# Patient Record
Sex: MALE | Race: WHITE | Employment: FULL TIME | URBAN - METROPOLITAN AREA
[De-identification: names, ages, dates, MRNs, and addresses within clinical notes are randomized per-mention and may not be internally consistent; named-entity substitution may affect disease eponyms.]

---

## 2017-01-03 ENCOUNTER — HOSPITAL ENCOUNTER (OUTPATIENT)
Dept: GENERAL RADIOLOGY | Facility: HOSPITAL | Age: 32
Discharge: HOME | End: 2017-01-03
Attending: FAMILY MEDICINE
Payer: COMMERCIAL

## 2017-01-03 DIAGNOSIS — S93.492A SPRAIN OF OTHER LIGAMENT OF LEFT ANKLE, INITIAL ENCOUNTER: ICD-10-CM

## 2017-01-03 PROCEDURE — 73600 X-RAY EXAM OF ANKLE: CPT

## 2017-02-02 ENCOUNTER — OFFICE VISIT (OUTPATIENT)
Dept: INTERNAL MEDICINE CLINIC | Facility: CLINIC | Age: 32
End: 2017-02-02

## 2017-02-02 VITALS
HEIGHT: 72 IN | BODY MASS INDEX: 24.41 KG/M2 | DIASTOLIC BLOOD PRESSURE: 70 MMHG | WEIGHT: 180.19 LBS | OXYGEN SATURATION: 98 % | HEART RATE: 84 BPM | SYSTOLIC BLOOD PRESSURE: 112 MMHG | TEMPERATURE: 98 F | RESPIRATION RATE: 16 BRPM

## 2017-02-02 DIAGNOSIS — G24.3 TORTICOLLIS, SPASMODIC: Primary | ICD-10-CM

## 2017-02-02 PROCEDURE — 99213 OFFICE O/P EST LOW 20 MIN: CPT | Performed by: FAMILY MEDICINE

## 2017-02-02 RX ORDER — IBUPROFEN 600 MG/1
600 TABLET ORAL EVERY 8 HOURS PRN
Qty: 50 TABLET | Refills: 0 | Status: SHIPPED | OUTPATIENT
Start: 2017-02-02

## 2017-02-02 RX ORDER — TRAMADOL HYDROCHLORIDE 50 MG/1
50 TABLET ORAL EVERY 6 HOURS PRN
Qty: 50 TABLET | Refills: 0 | Status: SHIPPED | OUTPATIENT
Start: 2017-02-02

## 2017-02-02 RX ORDER — CYCLOBENZAPRINE HCL 10 MG
10 TABLET ORAL 3 TIMES DAILY PRN
Qty: 50 TABLET | Refills: 0 | Status: SHIPPED | OUTPATIENT
Start: 2017-02-02 | End: 2017-02-22

## 2017-02-02 NOTE — PATIENT INSTRUCTIONS
Torticollis (Wry Neck)  Torticollis happens when muscles on one side of the neck contract (tighten). This causes the neck to twist or tilt to the side. The muscles may also be quite sore.  It affects mainly children and young adults, often appearing overn Depending on the cause, torticollis often goes away on its own. Follow up with your healthcare provider as instructed. If symptoms become worse, call your healthcare provider or return to the ER.   Date Last Reviewed: 9/30/2015  © [id number] [employer]

## 2017-02-03 NOTE — PROGRESS NOTES
CC:  Neck Pain      Hx of CC:  SEVERAL DAYS WITH NECK STIFFNESS/PAIN. HURTS TO TURN HEAD. NO TRAUMA, GRADUAL ONSET.     Vitals:    02/02/17  1430   BP: 112/70   Pulse: 84   Temp: 97.7 °F (36.5 °C)   TempSrc: Oral   Resp: 16   Height: 72\"   Weight: 180 lb

## 2017-06-07 DIAGNOSIS — F34.1 DYSTHYMIA: ICD-10-CM

## 2017-06-07 DIAGNOSIS — F40.01 PANIC DISORDER WITH AGORAPHOBIA: ICD-10-CM

## 2017-06-07 RX ORDER — SERTRALINE HYDROCHLORIDE 100 MG/1
TABLET, FILM COATED ORAL
Qty: 135 TABLET | Refills: 1 | Status: SHIPPED | OUTPATIENT
Start: 2017-06-07 | End: 2017-09-01

## 2017-06-07 NOTE — TELEPHONE ENCOUNTER
From: Catalina Weber  To: Ana Hammond DO  Sent: 6/7/2017 11:17 AM CDT  Subject: Medication Renewal Request    Original authorizing provider: DO Aashish Contreras would like a refill of the following medications:  Sertraline HCl 100 MG Oral

## 2017-09-01 DIAGNOSIS — F40.01 PANIC DISORDER WITH AGORAPHOBIA: ICD-10-CM

## 2017-09-01 DIAGNOSIS — F34.1 DYSTHYMIA: ICD-10-CM

## 2017-09-01 RX ORDER — SERTRALINE HYDROCHLORIDE 100 MG/1
TABLET, FILM COATED ORAL
Qty: 135 TABLET | Refills: 0 | Status: SHIPPED
Start: 2017-09-01

## 2024-04-23 ENCOUNTER — WALK IN (OUTPATIENT)
Dept: URGENT CARE | Age: 39
End: 2024-04-23
Attending: FAMILY MEDICINE

## 2024-04-23 VITALS
BODY MASS INDEX: 25.73 KG/M2 | DIASTOLIC BLOOD PRESSURE: 90 MMHG | WEIGHT: 190 LBS | OXYGEN SATURATION: 99 % | HEIGHT: 72 IN | RESPIRATION RATE: 18 BRPM | TEMPERATURE: 98.6 F | HEART RATE: 87 BPM | SYSTOLIC BLOOD PRESSURE: 142 MMHG
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
## 2024-04-23 RX ORDER — AMOXICILLIN AND CLAVULANATE POTASSIUM 875; 125 MG/1; MG/1
1 TABLET, FILM COATED ORAL 2 TIMES DAILY
Qty: 20 TABLET | Refills: 0 | Status: SHIPPED | OUTPATIENT
Start: 2024-04-23 | End: 2024-05-03

## 2024-04-23 ASSESSMENT — ENCOUNTER SYMPTOMS: WOUND: 1

## 2024-04-23 ASSESSMENT — PAIN SCALES - GENERAL: PAINLEVEL: 0

## (undated) NOTE — MR AVS SNAPSHOT
[address] [facility] [address] [facility] [id number] [phone]               Thank you for choosing us for your health care visit with Tristan Preston DO.   We are glad to serve you and happy to provide you with this corbett The neck will be examined, and questions about any current or former medical problems will be asked. X-rays of the neck may be taken to check for broken bones. Treatment  The goal in treating torticollis is to relax the neck muscles.  The best approach cintia 112/70 mmHg 84 97.7 °F (36.5 °C) (Oral) 72\" 180 lb 3.2 oz 24.43 kg/m2         Current Medications          This list is accurate as of: 2/2/17  2:59 PM.  Always use your most recent med list.                Cyclobenzaprine HCl 10 MG Tabs   Take 1 tablet Visit [facility] online at  [email or web address]

## (undated) NOTE — Clinical Note
2/2/2017              Aashish Murphy        [address] [facility] [address]         SEEN/EXAMINED TODAY WITH NECK SPASMS (TORTICOLLIS). BEING TREATED AND MAY RETURN TO WORK WHEN SYMPTOMS HAVE IMPROVED.       Sincerely,    SUMI Sauer